# Patient Record
Sex: FEMALE | Race: WHITE | NOT HISPANIC OR LATINO | Employment: UNEMPLOYED | URBAN - METROPOLITAN AREA
[De-identification: names, ages, dates, MRNs, and addresses within clinical notes are randomized per-mention and may not be internally consistent; named-entity substitution may affect disease eponyms.]

---

## 2019-09-24 ENCOUNTER — APPOINTMENT (OUTPATIENT)
Dept: LAB | Facility: HOSPITAL | Age: 54
End: 2019-09-24

## 2019-09-24 ENCOUNTER — OFFICE VISIT (OUTPATIENT)
Dept: GASTROENTEROLOGY | Facility: CLINIC | Age: 54
End: 2019-09-24

## 2019-09-24 VITALS
HEIGHT: 63 IN | WEIGHT: 235 LBS | BODY MASS INDEX: 41.64 KG/M2 | DIASTOLIC BLOOD PRESSURE: 76 MMHG | SYSTOLIC BLOOD PRESSURE: 122 MMHG

## 2019-09-24 DIAGNOSIS — R79.89 ELEVATED LIVER FUNCTION TESTS: Primary | ICD-10-CM

## 2019-09-24 LAB
ALBUMIN SERPL-MCNC: 4.5 G/DL (ref 3.5–5.2)
ALP SERPL-CCNC: 64 U/L (ref 39–117)
ALT SERPL W P-5'-P-CCNC: 29 U/L (ref 1–33)
AST SERPL-CCNC: 21 U/L (ref 1–32)
BILIRUB CONJ SERPL-MCNC: <0.2 MG/DL (ref 0.2–0.3)
BILIRUB INDIRECT SERPL-MCNC: ABNORMAL MG/DL
BILIRUB SERPL-MCNC: 0.9 MG/DL (ref 0.2–1.2)
PROT SERPL-MCNC: 7.7 G/DL (ref 6–8.5)

## 2019-09-24 PROCEDURE — 99213 OFFICE O/P EST LOW 20 MIN: CPT | Performed by: INTERNAL MEDICINE

## 2019-09-24 PROCEDURE — 36415 COLL VENOUS BLD VENIPUNCTURE: CPT | Performed by: INTERNAL MEDICINE

## 2019-09-24 PROCEDURE — 80076 HEPATIC FUNCTION PANEL: CPT | Performed by: INTERNAL MEDICINE

## 2019-09-24 NOTE — PROGRESS NOTES
PATIENT INFORMATION  Sarah Kaba       - 1965    CHIEF COMPLAINT  Chief Complaint   Patient presents with   • Abnormal Lab       HISTORY OF PRESENT ILLNESS  HPI    53 yo sent to me by pcp for elevated bilirubin found on labs earlier this year. Labs show normal ast/alt and alk.phos.  Total bilirubin of 1.5 noted.  No pain with eating. She had gallstones and cholecystectomy in .  No jaundice, new medications, etoh.  Last cls -no poyps  No dysphagia., intermittent gerd.  No family history of liver disorder.    REVIEW OF SYSTEMS  Review of Systems   HENT: Positive for sinus pressure.    Cardiovascular: Positive for chest pain and leg swelling.   Gastrointestinal: Positive for abdominal pain and nausea.        Reflux   Musculoskeletal: Positive for back pain.   Neurological: Positive for dizziness and headaches.   Hematological: Bruises/bleeds easily.   Psychiatric/Behavioral: Positive for sleep disturbance. The patient is nervous/anxious.    All other systems reviewed and are negative.        ACTIVE PROBLEMS  Patient Active Problem List    Diagnosis   • CCC (chronic calculous cholecystitis) [K80.10]   • Chronic nausea [R11.0]   • Abdominal pain, right upper quadrant [R10.11]   • Bradycardia [R00.1]   • Essential hypertension [I10]   • Fatigue [R53.83]   • Shortness of breath [R06.02]         PAST MEDICAL HISTORY  Past Medical History:   Diagnosis Date   • Anemia    • Arthritis    • Diabetes mellitus (CMS/HCC)    • GERD (gastroesophageal reflux disease)    • Hyperlipidemia    • Hypertension          SURGICAL HISTORY  Past Surgical History:   Procedure Laterality Date   • APPENDECTOMY     • CHOLECYSTECTOMY     • COLONOSCOPY N/A 2016    Procedure: COLONOSCOPY;  Surgeon: Shaylee Cobb MD;  Location: [facility];  Service:    • HYSTERECTOMY     • TONSILLECTOMY     • TUBAL ABDOMINAL LIGATION           FAMILY HISTORY  Family History   Problem Relation Age of Onset   • Breast cancer Mother    •  "Diabetes Mother    • Hypertension Mother    • Lung cancer Father    • Hypertension Sister    • Colon cancer Neg Hx    • Colon polyps Neg Hx          SOCIAL HISTORY  Social History     Occupational History   • Not on file   Tobacco Use   • Smoking status: Former Smoker     Types: Cigarettes     Last attempt to quit: 2004     Years since quittin.8   • Smokeless tobacco: Never Used   Substance and Sexual Activity   • Alcohol use: No   • Drug use: No   • Sexual activity: Defer       Debilities/Disabilities Identified: None    Emotional Behavior: Appropriate    CURRENT MEDICATIONS    Current Outpatient Medications:   •  Omega-3 Fatty Acids (FISH OIL) 1000 MG capsule capsule, Take  by mouth Daily With Breakfast., Disp: , Rfl:   •  potassium chloride (K-DUR,KLOR-CON) 10 MEQ CR tablet, Take 10 mEq by mouth 2 (Two) Times a Day., Disp: , Rfl:   •  pravastatin (PRAVACHOL) 20 MG tablet, Take 20 mg by mouth Daily., Disp: , Rfl:   •  warfarin (COUMADIN) 1 MG tablet, Take 1 mg by mouth Daily., Disp: , Rfl:   •  warfarin (COUMADIN) 5 MG tablet, Take 5 mg by mouth Daily., Disp: , Rfl:   •  amLODIPine (NORVASC) 5 MG tablet, Take 5 mg by mouth., Disp: , Rfl:   •  hydrochlorothiazide (HYDRODIURIL) 25 MG tablet, Take 25 mg by mouth Daily., Disp: , Rfl:   •  KERYDIN 5 % solution, , Disp: , Rfl:   •  raNITIdine (ZANTAC) 150 MG tablet, Take 150 mg by mouth 2 (Two) Times a Day., Disp: , Rfl:     ALLERGIES  Patient has no known allergies.    VITALS  Vitals:    19 1523   BP: 122/76   Weight: 107 kg (235 lb)   Height: 160 cm (63\")       LAST RESULTS   Admission on 2016, Discharged on 2016   Component Date Value Ref Range Status   • Glucose 2016 88  70 - 130 mg/dL Final     No results found.    PHYSICAL EXAM  Physical Exam   Constitutional: She is oriented to person, place, and time. She appears well-developed and well-nourished. No distress.   HENT:   Head: Normocephalic and atraumatic.   Mouth/Throat: " Oropharynx is clear and moist.   Eyes: EOM are normal. Pupils are equal, round, and reactive to light.   Neck: Normal range of motion. No tracheal deviation present.   Cardiovascular: Normal rate, regular rhythm, normal heart sounds and intact distal pulses. Exam reveals no gallop and no friction rub.   No murmur heard.  Pulmonary/Chest: Effort normal and breath sounds normal. No stridor. No respiratory distress. She has no wheezes. She has no rales. She exhibits no tenderness.   Abdominal: Soft. Bowel sounds are normal. She exhibits no distension. There is no tenderness. There is no rebound and no guarding.   Musculoskeletal: She exhibits no edema.   Lymphadenopathy:     She has no cervical adenopathy.   Neurological: She is alert and oriented to person, place, and time.   Skin: Skin is warm. She is not diaphoretic.   Psychiatric: She has a normal mood and affect. Her behavior is normal. Judgment and thought content normal.   Nursing note and vitals reviewed.      ASSESSMENT  Diagnoses and all orders for this visit:    Elevated liver function tests  -     US Liver; Future  -     Bilirubin, Total & Direct  -     Hepatic Function Panel          PLAN  No Follow-up on file.    Will get us and fractionate bilirubin

## 2019-10-01 ENCOUNTER — HOSPITAL ENCOUNTER (OUTPATIENT)
Dept: ULTRASOUND IMAGING | Facility: HOSPITAL | Age: 54
Discharge: HOME | End: 2019-10-01
Admitting: INTERNAL MEDICINE

## 2019-10-01 DIAGNOSIS — R79.89 ELEVATED LIVER FUNCTION TESTS: ICD-10-CM

## 2019-10-01 PROCEDURE — 76705 ECHO EXAM OF ABDOMEN: CPT

## 2019-11-05 ENCOUNTER — OFFICE VISIT (OUTPATIENT)
Dept: GASTROENTEROLOGY | Facility: CLINIC | Age: 54
End: 2019-11-05

## 2019-11-05 VITALS
BODY MASS INDEX: 42.03 KG/M2 | SYSTOLIC BLOOD PRESSURE: 118 MMHG | HEIGHT: 63 IN | WEIGHT: 237.2 LBS | DIASTOLIC BLOOD PRESSURE: 78 MMHG

## 2019-11-05 DIAGNOSIS — K76.0 FATTY LIVER: ICD-10-CM

## 2019-11-05 DIAGNOSIS — R17 ELEVATED BILIRUBIN: Primary | ICD-10-CM

## 2019-11-05 PROCEDURE — 99213 OFFICE O/P EST LOW 20 MIN: CPT | Performed by: INTERNAL MEDICINE

## 2019-11-05 NOTE — PROGRESS NOTES
PATIENT INFORMATION  Sarah Kaba       - 1965    CHIEF COMPLAINT  Chief Complaint   Patient presents with   • Follow-up     6 week follow up on Elevated LFT'S       HISTORY OF PRESENT ILLNESS  HPI    She is here in follow up for elevated bilirubin. Labs done at that time showed bilirubin of 1.5.  Labs done now:  Alkaline Phosphatase  39 - 117 U/L 64    Total Bilirubin  0.2 - 1.2 mg/dL 0.9    Bilirubin, Direct  0.2 - 0.3 mg/dL <0.2 Abnormally low     Bilirubin, Indirect    Comment: Unable to calculate       FINDINGS:  The gallbladder is surgically absent. There is no intrahepatic or  extrahepatic bile duct dilatation (upper extra hepatic bile duct  measures about 5 mm).     Echodense hepatic parenchyma suggesting diffuse hepatic steatosis. No  mass or other focal liver lesion. No free fluid in the right upper  abdomen. Pancreas is largely obscured by overlying bowel gas. Right  kidney is negative with no hydronephrosis.     IMPRESSION:  1. Cholecystectomy.  2. No significant bile duct dilatation.  3. Probable diffuse hepatic steatosis.       She had cls in 2016 and no polyps noted.  Weight has been stable. bm are regular, 4-5 times daily, no blood. She has noted some issues with early morning nausea and and crampy pain, mild  No changes in bowel habit.s  She was given rx for zantac but stopped due to concerns of false positive for methamphetamines.      REVIEW OF SYSTEMS  Review of Systems   Constitutional: Positive for fatigue.   HENT: Positive for dental problem and ear pain.    Gastrointestinal: Positive for abdominal pain and nausea.   Musculoskeletal: Positive for back pain.   Neurological: Positive for light-headedness.   All other systems reviewed and are negative.        ACTIVE PROBLEMS  Patient Active Problem List    Diagnosis   • CCC (chronic calculous cholecystitis) [K80.10]   • Chronic nausea [R11.0]   • Abdominal pain, right upper quadrant [R10.11]   • Bradycardia [R00.1]   • Essential  hypertension [I10]   • Fatigue [R53.83]   • Shortness of breath [R06.02]         PAST MEDICAL HISTORY  Past Medical History:   Diagnosis Date   • Anemia    • Arthritis    • Diabetes mellitus (CMS/HCC)    • GERD (gastroesophageal reflux disease)    • Hyperlipidemia    • Hypertension          SURGICAL HISTORY  Past Surgical History:   Procedure Laterality Date   • APPENDECTOMY     • CHOLECYSTECTOMY     • COLONOSCOPY N/A 12/7/2016    Procedure: COLONOSCOPY;  Surgeon: Shaylee Cobb MD;  Location: [facility];  Service:    • HYSTERECTOMY     • TONSILLECTOMY     • TUBAL ABDOMINAL LIGATION           FAMILY HISTORY  Family History   Problem Relation Age of Onset   • Breast cancer Mother    • Diabetes Mother    • Hypertension Mother    • Lung cancer Father    • Hypertension Sister    • Colon cancer Neg Hx    • Colon polyps Neg Hx          SOCIAL HISTORY  Social History     Occupational History   • Not on file   Tobacco Use   • Smoking status: Former Smoker     Types: Cigarettes     Last attempt to quit: 11/7/2004     Years since quitting: 15.0   • Smokeless tobacco: Never Used   Substance and Sexual Activity   • Alcohol use: No   • Drug use: No   • Sexual activity: Defer       Debilities/Disabilities Identified: None    Emotional Behavior: Appropriate    CURRENT MEDICATIONS    Current Outpatient Medications:   •  amLODIPine (NORVASC) 5 MG tablet, Take 5 mg by mouth., Disp: , Rfl:   •  hydrochlorothiazide (HYDRODIURIL) 25 MG tablet, Take 25 mg by mouth Daily., Disp: , Rfl:   •  KERYDIN 5 % solution, , Disp: , Rfl:   •  Omega-3 Fatty Acids (FISH OIL) 1000 MG capsule capsule, Take  by mouth Daily With Breakfast., Disp: , Rfl:   •  potassium chloride (K-DUR,KLOR-CON) 10 MEQ CR tablet, Take 10 mEq by mouth 2 (Two) Times a Day., Disp: , Rfl:   •  pravastatin (PRAVACHOL) 20 MG tablet, Take 20 mg by mouth Daily., Disp: , Rfl:   •  raNITIdine (ZANTAC) 150 MG tablet, Take 150 mg by mouth 2 (Two) Times a Day., Disp: , Rfl:   •  warfarin  "(COUMADIN) 1 MG tablet, Take 1 mg by mouth Daily., Disp: , Rfl:   •  warfarin (COUMADIN) 5 MG tablet, Take 5 mg by mouth Daily., Disp: , Rfl:     ALLERGIES  Patient has no known allergies.    VITALS  Vitals:    11/05/19 1322   BP: 118/78   Weight: 108 kg (237 lb 3.2 oz)   Height: 160 cm (62.99\")       LAST RESULTS   Office Visit on 09/24/2019   Component Date Value Ref Range Status   • Total Protein 09/24/2019 7.7  6.0 - 8.5 g/dL Final   • Albumin 09/24/2019 4.50  3.50 - 5.20 g/dL Final   • ALT (SGPT) 09/24/2019 29  1 - 33 U/L Final   • AST (SGOT) 09/24/2019 21  1 - 32 U/L Final   • Alkaline Phosphatase 09/24/2019 64  39 - 117 U/L Final   • Total Bilirubin 09/24/2019 0.9  0.2 - 1.2 mg/dL Final   • Bilirubin, Direct 09/24/2019 <0.2* 0.2 - 0.3 mg/dL Final   • Bilirubin, Indirect 09/24/2019    Final    Unable to calculate     No results found.    PHYSICAL EXAM  Physical Exam   Constitutional: She is oriented to person, place, and time. She appears well-developed and well-nourished. No distress.   HENT:   Head: Normocephalic and atraumatic.   Mouth/Throat: Oropharynx is clear and moist.   Eyes: EOM are normal. Pupils are equal, round, and reactive to light.   Neck: Normal range of motion. No tracheal deviation present.   Cardiovascular: Normal rate, regular rhythm, normal heart sounds and intact distal pulses. Exam reveals no gallop and no friction rub.   No murmur heard.  Pulmonary/Chest: Effort normal and breath sounds normal. No stridor. No respiratory distress. She has no wheezes. She has no rales. She exhibits no tenderness.   Abdominal: Soft. Bowel sounds are normal. She exhibits no distension. There is no tenderness. There is no rebound and no guarding.   Musculoskeletal: She exhibits no edema.   Lymphadenopathy:     She has no cervical adenopathy.   Neurological: She is alert and oriented to person, place, and time.   Skin: Skin is warm. She is not diaphoretic.   Psychiatric: She has a normal mood and affect. " Her behavior is normal. Judgment and thought content normal.   Nursing note and vitals reviewed.      ASSESSMENT  Diagnoses and all orders for this visit:    Elevated bilirubin    Fatty liver          PLAN  No Follow-up on file.    Liver enzymes normal, us with no evidence of obstruction.  Fractionation shows mostly all indirect bilirubin.    She plans on updating mammogram and pelvic exams with Dr. Acuña soon.      Fatty liver discussed and low carb diet discussed. Liver enzymes are normal.  Align once daily

## 2020-10-13 ENCOUNTER — OFFICE VISIT (OUTPATIENT)
Dept: OBSTETRICS AND GYNECOLOGY | Facility: CLINIC | Age: 55
End: 2020-10-13

## 2020-10-13 VITALS
HEIGHT: 63 IN | DIASTOLIC BLOOD PRESSURE: 84 MMHG | BODY MASS INDEX: 45.48 KG/M2 | WEIGHT: 256.7 LBS | SYSTOLIC BLOOD PRESSURE: 130 MMHG

## 2020-10-13 DIAGNOSIS — Z13.9 SCREENING FOR CONDITION: ICD-10-CM

## 2020-10-13 DIAGNOSIS — Z80.41 FAMILY HISTORY OF MALIGNANT NEOPLASM OF OVARY: ICD-10-CM

## 2020-10-13 DIAGNOSIS — Z01.419 PAP SMEAR, LOW-RISK: ICD-10-CM

## 2020-10-13 DIAGNOSIS — Z90.710 HISTORY OF ABDOMINAL HYSTERECTOMY: ICD-10-CM

## 2020-10-13 DIAGNOSIS — Z80.3 FAMILY HISTORY OF BREAST CANCER IN MOTHER: ICD-10-CM

## 2020-10-13 DIAGNOSIS — E66.01 MORBID OBESITY WITH BMI OF 45.0-49.9, ADULT (HCC): ICD-10-CM

## 2020-10-13 DIAGNOSIS — Z01.419 ROUTINE GYNECOLOGICAL EXAMINATION: ICD-10-CM

## 2020-10-13 DIAGNOSIS — I10 ESSENTIAL HYPERTENSION: ICD-10-CM

## 2020-10-13 DIAGNOSIS — I26.99 PULMONARY EMBOLISM ON LEFT (HCC): Primary | ICD-10-CM

## 2020-10-13 DIAGNOSIS — Z01.419 WELL WOMAN EXAM: ICD-10-CM

## 2020-10-13 DIAGNOSIS — Z90.49 HISTORY OF CHOLECYSTECTOMY: ICD-10-CM

## 2020-10-13 DIAGNOSIS — Z90.49 HISTORY OF APPENDECTOMY: ICD-10-CM

## 2020-10-13 LAB
BILIRUB BLD-MCNC: NEGATIVE MG/DL
CLARITY, POC: CLEAR
COLOR UR: YELLOW
GLUCOSE UR STRIP-MCNC: NEGATIVE MG/DL
KETONES UR QL: NEGATIVE
LEUKOCYTE EST, POC: NEGATIVE
NITRITE UR-MCNC: NEGATIVE MG/ML
PH UR: 5 [PH] (ref 5–8)
PROT UR STRIP-MCNC: NEGATIVE MG/DL
RBC # UR STRIP: NEGATIVE /UL
SP GR UR: 1 (ref 1–1.03)
UROBILINOGEN UR QL: NORMAL

## 2020-10-13 PROCEDURE — 99386 PREV VISIT NEW AGE 40-64: CPT | Performed by: OBSTETRICS & GYNECOLOGY

## 2020-10-13 RX ORDER — AMLODIPINE BESYLATE 10 MG/1
10 TABLET ORAL DAILY
COMMUNITY
Start: 2020-09-09

## 2020-10-13 NOTE — PROGRESS NOTES
GYN Annual Exam     CC- Here for annual exam.     Pt new to practice? Yes  Pt new to me? Yes     Sarah Kaba is a 55 y.o.  female who presents for annual well woman exam. No LMP recorded (lmp unknown). Patient has had a hysterectomy.    Problems in addition to need for annual: menopause symptoms.  Pt has multiple medical problems including history of a PE that affects HRT management.     HPI: History of Present Illness    PMHX:  Patient Active Problem List   Diagnosis   • Bradycardia   • Essential hypertension   • Fatigue   • Shortness of breath   • Pulmonary embolism on left (CMS/HCC)   • History of cholecystectomy   • History of appendectomy   • History of abdominal hysterectomy   • Morbid obesity with BMI of 45.0-49.9, adult (CMS/HCC)   • Family history of breast cancer in mother   • Family history of malignant neoplasm of ovary   ; otherwise none    OB History        3    Para   3    Term   3            AB        Living           SAB        TAB        Ectopic        Molar        Multiple        Live Births                      Past Medical History:   Diagnosis Date   • Anemia    • Arthritis    • Diabetes mellitus (CMS/HCC)    • GERD (gastroesophageal reflux disease)    • Hyperlipidemia    • Hypertension        Past Surgical History:   Procedure Laterality Date   • APPENDECTOMY     • CHOLECYSTECTOMY     • COLONOSCOPY N/A 2016    Procedure: COLONOSCOPY;  Surgeon: Shaylee Cobb MD;  Location: [facility];  Service:    • HYSTERECTOMY     • TONSILLECTOMY     • TUBAL ABDOMINAL LIGATION           Current Outpatient Medications:   •  amLODIPine (NORVASC) 10 MG tablet, Take 10 mg by mouth Daily., Disp: , Rfl:   •  hydrochlorothiazide (HYDRODIURIL) 25 MG tablet, Take 25 mg by mouth Daily., Disp: , Rfl:   •  Omega-3 Fatty Acids (FISH OIL) 1000 MG capsule capsule, Take  by mouth Daily With Breakfast., Disp: , Rfl:   •  potassium chloride (K-DUR,KLOR-CON) 10 MEQ CR tablet, Take 10 mEq by mouth  "2 (Two) Times a Day., Disp: , Rfl:   •  pravastatin (PRAVACHOL) 20 MG tablet, Take 20 mg by mouth Daily., Disp: , Rfl:   •  warfarin (COUMADIN) 5 MG tablet, Take 5 mg by mouth Daily., Disp: , Rfl:   •  raNITIdine (ZANTAC) 150 MG tablet, Take 150 mg by mouth 2 (Two) Times a Day., Disp: , Rfl:   •  warfarin (COUMADIN) 1 MG tablet, Take 1 mg by mouth Daily., Disp: , Rfl:     No Known Allergies    Social History     Tobacco Use   • Smoking status: Former Smoker     Types: Cigarettes     Quit date: 11/7/2004     Years since quitting: 15.9   • Smokeless tobacco: Never Used   Substance Use Topics   • Alcohol use: No   • Drug use: No       Smoker: No    Family History   Problem Relation Age of Onset   • Breast cancer Mother    • Diabetes Mother    • Hypertension Mother    • Lung cancer Father    • Hypertension Sister    • Colon cancer Neg Hx    • Colon polyps Neg Hx        Review of Systems        EXAM:  /84   Ht 160 cm (62.99\")   Wt 116 kg (256 lb 11.2 oz)   LMP  (LMP Unknown)   Breastfeeding No   BMI 45.48 kg/m²     Physical Exam  Vitals signs and nursing note reviewed. Exam conducted with a chaperone present.   Constitutional:       General: She is not in acute distress.     Appearance: She is well-developed. She is not diaphoretic.   HENT:      Head: Normocephalic and atraumatic.      Nose: Nose normal.   Eyes:      Extraocular Movements: Extraocular movements intact.   Neck:      Musculoskeletal: Normal range of motion.   Cardiovascular:      Rate and Rhythm: Normal rate.   Pulmonary:      Effort: Pulmonary effort is normal.   Chest:      Breasts: Breasts are symmetrical.         Right: Normal. No mass, nipple discharge, skin change or tenderness.         Left: Normal. No mass, nipple discharge, skin change or tenderness.   Abdominal:      General: There is no distension.      Palpations: Abdomen is soft. There is no mass.      Tenderness: There is no abdominal tenderness. There is no guarding. "   Genitourinary:     General: Normal vulva.      Pubic Area: No rash.       Vagina: Normal. No vaginal discharge.      Adnexa: Right adnexa normal and left adnexa normal.      Comments: cx and uterus absent.  Pap done  Musculoskeletal: Normal range of motion.         General: No tenderness or deformity.   Lymphadenopathy:      Upper Body:      Right upper body: No axillary adenopathy.      Left upper body: No axillary adenopathy.   Skin:     General: Skin is warm and dry.      Coloration: Skin is not pale.      Findings: No erythema or rash.   Neurological:      Mental Status: She is alert and oriented to person, place, and time.   Psychiatric:         Behavior: Behavior normal.         Thought Content: Thought content normal.         Judgment: Judgment normal.            As part of wellness and prevention, the following topics were discussed with the patient:  []  Nutrition  []  Physical activity/regular exercise   [x]  Healthy weight  []  Injury prevention  [x]  Substance misuse/abuse  []  Sexual behavior  []  STD prevention  []  Contaception  []  Dental health  [x]  Mental health  []  Immunization  [x]  Encouraged SBE     Counseling and guidance done:  Nutrition, physical activity, healthy weight, injury prevention, misuse of tobacco, alcohol and drugs, sexual behavior and STDs, contraception, dental health, mental health, immunizations breast cancer screening and exams.    Assessment     1) GYN annual well woman exam.   2) PAP done today? Yes  3) problems addressed: fhx breast cancer, PE, see above    MAMMOGRAM UP TO DATE IF AGE APPROPRIATE?  No    COLONOSCOPY UP TO DATE IF AGE APPROPRIATE? Yes    Fhx breast cancer? Yes : mother    Fhx ovarian cancer? Yes: aunt    Fhx colon cancer? No    Invitae testing offered? Yes  Drawn today.           Plan       Follow up prn or one year.    Diagnoses and all orders for this visit:    1. Pulmonary embolism on left (CMS/HCC) (Primary)  -     EV  -     Anticardiolipin  Antibody, IgG / M, Qn  -     Antithrombin III  -     Factor 5 Leiden  -     Factor II, DNA Analysis  -     Protein C Deficiency Profile  -     Protein S Panel    2. Screening for condition  -     POC Urinalysis Dipstick  -     Mammo Screening Digital Tomosynthesis Bilateral With CAD; Future    3. Routine gynecological examination  -     Pap IG, HPV-hr    4. Pap smear, low-risk  -     Pap IG, HPV-hr    5. Essential hypertension    6. History of appendectomy    7. History of cholecystectomy    8. History of abdominal hysterectomy    9. Morbid obesity with BMI of 45.0-49.9, adult (CMS/HCC)    10. Family history of breast cancer in mother    11. Family history of malignant neoplasm of ovary    12. Well woman exam        RTO Return in about 1 year (around 10/13/2021) for Annual physical.    TIME: More than 50% of time spent in counseling and/or coordination of care.Time spent in counseling 20 min.  Counseling included the following topics PE, HRT, obesity, thrombophilia with prognosis, differential diagnosis, risks, benefits of treatment, instructions, compliance and/or risk reduction and alternatives.       Vadim Acuña MD  [unfilled]  11:11 EDT

## 2020-10-16 LAB
CYTOLOGIST CVX/VAG CYTO: NORMAL
CYTOLOGY CVX/VAG DOC CYTO: NORMAL
CYTOLOGY CVX/VAG DOC THIN PREP: NORMAL
DX ICD CODE: NORMAL
HIV 1 & 2 AB SER-IMP: NORMAL
HPV I/H RISK 1 DNA CVX QL PROBE+SIG AMP: NEGATIVE
Lab: NORMAL
OTHER STN SPEC: NORMAL
STAT OF ADQ CVX/VAG CYTO-IMP: NORMAL

## 2020-10-20 LAB
ANA SER QL: NEGATIVE
AT III ACT/NOR PPP CHRO: 104 % (ref 75–135)
CARDIOLIPIN IGG SER IA-ACNC: <9 GPL U/ML (ref 0–14)
CARDIOLIPIN IGM SER IA-ACNC: 19 MPL U/ML (ref 0–12)
F2 GENE MUT ANL BLD/T: ABNORMAL
F5 GENE MUT ANL BLD/T: NORMAL
PROT C ACT/NOR PPP: 71 % (ref 73–180)
PROT C AG ACT/NOR PPP IA: 68 % (ref 60–150)
PROT S ACT/NOR PPP: 31 % (ref 63–140)
PROT S AG ACT/NOR PPP IA: 71 % (ref 60–150)
PROT S FREE AG ACT/NOR PPP IA: 74 % (ref 57–157)

## 2020-10-21 DIAGNOSIS — D68.59 THROMBOPHILIA (HCC): Primary | ICD-10-CM

## 2021-01-08 ENCOUNTER — APPOINTMENT (OUTPATIENT)
Dept: MAMMOGRAPHY | Facility: HOSPITAL | Age: 56
End: 2021-01-08